# Patient Record
Sex: MALE | Race: WHITE | Employment: UNEMPLOYED | URBAN - METROPOLITAN AREA
[De-identification: names, ages, dates, MRNs, and addresses within clinical notes are randomized per-mention and may not be internally consistent; named-entity substitution may affect disease eponyms.]

---

## 2024-11-19 ENCOUNTER — HOSPITAL ENCOUNTER (OUTPATIENT)
Age: 54
Discharge: HOME | End: 2024-11-19
Payer: COMMERCIAL

## 2024-11-19 ENCOUNTER — HOSPITAL ENCOUNTER (OUTPATIENT)
Dept: GENERAL RADIOLOGY | Age: 54
Discharge: HOME | End: 2024-11-21
Payer: COMMERCIAL

## 2024-11-19 ENCOUNTER — APPOINTMENT (OUTPATIENT)
Dept: GENERAL RADIOLOGY | Age: 54
End: 2024-11-19
Payer: COMMERCIAL

## 2024-11-19 ENCOUNTER — HOSPITAL ENCOUNTER (OUTPATIENT)
Age: 54
Discharge: HOME | End: 2024-11-21
Payer: COMMERCIAL

## 2024-11-19 DIAGNOSIS — I48.0 PAROXYSMAL ATRIAL FIBRILLATION (HCC): ICD-10-CM

## 2024-11-19 LAB
ALBUMIN SERPL-MCNC: 4.4 G/DL (ref 3.5–5.2)
ALP SERPL-CCNC: 85 U/L (ref 40–129)
ALT SERPL-CCNC: 21 U/L (ref 0–40)
ANION GAP SERPL CALCULATED.3IONS-SCNC: 8 MMOL/L (ref 7–16)
AST SERPL-CCNC: 18 U/L (ref 0–39)
BASOPHILS # BLD: 0.07 K/UL (ref 0–0.2)
BASOPHILS NFR BLD: 1 % (ref 0–2)
BILIRUB SERPL-MCNC: 0.5 MG/DL (ref 0–1.2)
BUN SERPL-MCNC: 12 MG/DL (ref 6–20)
CALCIUM SERPL-MCNC: 9.2 MG/DL (ref 8.6–10.2)
CHLORIDE SERPL-SCNC: 104 MMOL/L (ref 98–107)
CO2 SERPL-SCNC: 30 MMOL/L (ref 22–29)
CREAT SERPL-MCNC: 0.9 MG/DL (ref 0.7–1.2)
EKG ATRIAL RATE: 51 BPM
EKG P AXIS: 73 DEGREES
EKG P-R INTERVAL: 164 MS
EKG Q-T INTERVAL: 452 MS
EKG QRS DURATION: 108 MS
EKG QTC CALCULATION (BAZETT): 416 MS
EKG R AXIS: -50 DEGREES
EKG T AXIS: 42 DEGREES
EKG VENTRICULAR RATE: 51 BPM
EOSINOPHIL # BLD: 0.81 K/UL (ref 0.05–0.5)
EOSINOPHILS RELATIVE PERCENT: 7 % (ref 0–6)
ERYTHROCYTE [DISTWIDTH] IN BLOOD BY AUTOMATED COUNT: 13.6 % (ref 11.5–15)
GFR, ESTIMATED: >90 ML/MIN/1.73M2
GLUCOSE SERPL-MCNC: 90 MG/DL (ref 74–99)
HBA1C MFR BLD: 6.1 % (ref 4–5.6)
HCT VFR BLD AUTO: 51.5 % (ref 37–54)
HGB BLD-MCNC: 17.1 G/DL (ref 12.5–16.5)
IMM GRANULOCYTES # BLD AUTO: 0.06 K/UL (ref 0–0.58)
IMM GRANULOCYTES NFR BLD: 1 % (ref 0–5)
LYMPHOCYTES NFR BLD: 2.21 K/UL (ref 1.5–4)
LYMPHOCYTES RELATIVE PERCENT: 20 % (ref 20–42)
MCH RBC QN AUTO: 30.8 PG (ref 26–35)
MCHC RBC AUTO-ENTMCNC: 33.2 G/DL (ref 32–34.5)
MCV RBC AUTO: 92.8 FL (ref 80–99.9)
MONOCYTES NFR BLD: 1.04 K/UL (ref 0.1–0.95)
MONOCYTES NFR BLD: 10 % (ref 2–12)
NEUTROPHILS NFR BLD: 62 % (ref 43–80)
NEUTS SEG NFR BLD: 6.78 K/UL (ref 1.8–7.3)
PLATELET # BLD AUTO: 260 K/UL (ref 130–450)
PMV BLD AUTO: 9.4 FL (ref 7–12)
POTASSIUM SERPL-SCNC: 4.7 MMOL/L (ref 3.5–5)
PROT SERPL-MCNC: 7.5 G/DL (ref 6.4–8.3)
RBC # BLD AUTO: 5.55 M/UL (ref 3.8–5.8)
SODIUM SERPL-SCNC: 142 MMOL/L (ref 132–146)
T4 FREE SERPL-MCNC: 1.3 NG/DL (ref 0.9–1.7)
TSH SERPL DL<=0.05 MIU/L-ACNC: 1.82 UIU/ML (ref 0.27–4.2)
WBC OTHER # BLD: 11 K/UL (ref 4.5–11.5)

## 2024-11-19 PROCEDURE — 36415 COLL VENOUS BLD VENIPUNCTURE: CPT

## 2024-11-19 PROCEDURE — 80061 LIPID PANEL: CPT

## 2024-11-19 PROCEDURE — 84439 ASSAY OF FREE THYROXINE: CPT

## 2024-11-19 PROCEDURE — 84443 ASSAY THYROID STIM HORMONE: CPT

## 2024-11-19 PROCEDURE — 83036 HEMOGLOBIN GLYCOSYLATED A1C: CPT

## 2024-11-19 PROCEDURE — 71046 X-RAY EXAM CHEST 2 VIEWS: CPT

## 2024-11-19 PROCEDURE — 85025 COMPLETE CBC W/AUTO DIFF WBC: CPT

## 2024-11-19 PROCEDURE — 93005 ELECTROCARDIOGRAM TRACING: CPT | Performed by: NURSE PRACTITIONER

## 2024-11-19 PROCEDURE — 80053 COMPREHEN METABOLIC PANEL: CPT

## 2024-11-20 LAB
CHOLEST SERPL-MCNC: 130 MG/DL
HDLC SERPL-MCNC: 53 MG/DL
LDLC SERPL CALC-MCNC: 53 MG/DL
TRIGL SERPL-MCNC: 118 MG/DL
VLDLC SERPL CALC-MCNC: 24 MG/DL

## 2024-12-23 ENCOUNTER — HOSPITAL ENCOUNTER (OUTPATIENT)
Dept: MRI IMAGING | Age: 54
Discharge: HOME | End: 2024-12-25
Payer: COMMERCIAL

## 2024-12-23 ENCOUNTER — HOSPITAL ENCOUNTER (OUTPATIENT)
Dept: PULMONOLOGY | Age: 54
Discharge: HOME | End: 2024-12-23
Payer: COMMERCIAL

## 2024-12-23 DIAGNOSIS — Z86.73: ICD-10-CM

## 2024-12-23 DIAGNOSIS — I48.0 PAROXYSMAL ATRIAL FIBRILLATION (HCC): ICD-10-CM

## 2024-12-23 DIAGNOSIS — F80.81 STUTTERINGS: ICD-10-CM

## 2024-12-23 DIAGNOSIS — I25.10 ASCVD (ARTERIOSCLEROTIC CARDIOVASCULAR DISEASE): ICD-10-CM

## 2024-12-23 DIAGNOSIS — I25.10 ASHD (ARTERIOSCLEROTIC HEART DISEASE): ICD-10-CM

## 2024-12-23 PROCEDURE — 70547 MR ANGIOGRAPHY NECK W/O DYE: CPT

## 2024-12-23 PROCEDURE — 94726 PLETHYSMOGRAPHY LUNG VOLUMES: CPT

## 2024-12-23 PROCEDURE — 94060 EVALUATION OF WHEEZING: CPT

## 2024-12-23 PROCEDURE — 70551 MRI BRAIN STEM W/O DYE: CPT

## 2024-12-23 PROCEDURE — 94729 DIFFUSING CAPACITY: CPT

## 2024-12-23 PROCEDURE — 70544 MR ANGIOGRAPHY HEAD W/O DYE: CPT

## 2024-12-23 NOTE — PROCEDURES
[address] [address]                           PULMONARY FUNCTION      PATIENT NAME: ROBBIE CRISTOBAL            : 1970  MED REC NO: [id number]                        ROOM:   ACCOUNT NO: [id number]                       ADMIT DATE: 2024  PROVIDER: Heather Ware MD      DATE OF PROCEDURE: 2024    SURGEON:  Heather Ware MD    REFERRING PHYSICIAN:  CARLOS SALMERON III    The spirometry shows minimal reduction in FVC.  FEV1 and FEV1/FVC are moderately decreased.  The maximum voluntary ventilation is 38% of predicted value.    According to Z-score criteria, FVC, FEV1, and FEV1/FVC are outside the area of curve, more than standard deviation of -1.64.  After bronchodilator therapy, there is significant improvement seen in the spirometry.    The lung volume study shows normal TLC, but increased RV.    The diffusion capacity is minimally decreased.    IMPRESSION:  The above study shows moderate obstructive ventilatory impairment with air trapping, and there is significant improvement after bronchodilator therapy.    GOLD criteria stage II chronic obstructive pulmonary disease.  This patient may have chronic obstructive pulmonary disease-asthma overlap syndrome.  Clinical correlation needed.          HEATHER WARE MD      D:  2024 08:33:41     T:  2024 09:00:27     NAVEEN/RHIANNON  Job #:  [id number]     Doc#:  [id number]

## 2025-01-30 ENCOUNTER — TELEPHONE (OUTPATIENT)
Dept: ADMINISTRATIVE | Age: 55
End: 2025-01-30

## 2025-01-30 NOTE — TELEPHONE ENCOUNTER
.Patient Appointment Form:      PCP: Reinaldo Nichole III, APRN - NP    Referring: Reinaldo Nichole III, APRN - NP    Has the Patient:    Seen a Cardiologist? yes    date:5/23/2024  Physician:Dr. Medrano  location:[facility]     Had a heart catheterization? no    Had heart surgery? no    Had a stress test or nuclear stress test? yes   date: 12/2022   facility name:  unknown    Had an echocardiogram? yes   date: 11/4/2022   facility name:  [facility]    Had a vascular ultrasound? yes   date: unknown   facility name:  unknown    Had a 24/48 heart monitor or extended cardiac event monitor? 24hr   date: unknown      Who ordered: unknown    Had recent blood work in the last 6 months? yes    date: 11/19/2024    ordering physician: Reinaldo Nichole III, ARPN-NP    Had a pacemaker/ICD/ILR implant? yes: ILR      device company: unknown    Seen an Electrophysiologist? no        Will send records via: in Epic      Date & time of appointment:  2/21/2025 Agustin

## 2025-02-21 ENCOUNTER — OFFICE VISIT (OUTPATIENT)
Dept: CARDIOLOGY CLINIC | Age: 55
End: 2025-02-21
Payer: COMMERCIAL

## 2025-02-21 VITALS
RESPIRATION RATE: 16 BRPM | WEIGHT: 279 LBS | HEART RATE: 59 BPM | DIASTOLIC BLOOD PRESSURE: 72 MMHG | SYSTOLIC BLOOD PRESSURE: 110 MMHG | BODY MASS INDEX: 41.32 KG/M2 | HEIGHT: 69 IN

## 2025-02-21 DIAGNOSIS — Z86.73: Primary | ICD-10-CM

## 2025-02-21 PROCEDURE — 99244 OFF/OP CNSLTJ NEW/EST MOD 40: CPT | Performed by: INTERNAL MEDICINE

## 2025-02-21 PROCEDURE — 93000 ELECTROCARDIOGRAM COMPLETE: CPT | Performed by: INTERNAL MEDICINE

## 2025-02-21 RX ORDER — ATORVASTATIN CALCIUM 20 MG/1
20 TABLET, FILM COATED ORAL DAILY
COMMUNITY
Start: 2025-01-17

## 2025-02-21 RX ORDER — AMIODARONE HYDROCHLORIDE 200 MG/1
200 TABLET ORAL DAILY
COMMUNITY
Start: 2025-01-16 | End: 2025-02-21

## 2025-02-21 RX ORDER — BUSPIRONE HYDROCHLORIDE 10 MG/1
10 TABLET ORAL 2 TIMES DAILY
COMMUNITY

## 2025-02-21 RX ORDER — METOPROLOL SUCCINATE 50 MG/1
50 TABLET, EXTENDED RELEASE ORAL DAILY
COMMUNITY
Start: 2025-01-03

## 2025-02-21 RX ORDER — GABAPENTIN 100 MG/1
100 CAPSULE ORAL DAILY
COMMUNITY

## 2025-02-21 RX ORDER — ALBUTEROL SULFATE 90 UG/1
2 INHALANT RESPIRATORY (INHALATION) EVERY 6 HOURS PRN
COMMUNITY

## 2025-02-21 RX ORDER — NITROGLYCERIN 0.4 MG/1
0.4 TABLET SUBLINGUAL EVERY 5 MIN PRN
COMMUNITY

## 2025-02-21 RX ORDER — FLUTICASONE FUROATE, UMECLIDINIUM BROMIDE AND VILANTEROL TRIFENATATE 100; 62.5; 25 UG/1; UG/1; UG/1
1 POWDER RESPIRATORY (INHALATION) DAILY
COMMUNITY

## 2025-02-21 RX ORDER — APIXABAN 5 MG/1
5 TABLET, FILM COATED ORAL 2 TIMES DAILY
COMMUNITY
Start: 2025-01-03

## 2025-02-21 NOTE — PROGRESS NOTES
[facility] consult  Dr. Tamela Lebron      Reason for Consult: Atrial fibrillation  Referring Physician: Reinaldo Nichole III, APRN - NP     CHIEF COMPLAINT:   Chief Complaint   Patient presents with    Consultation     NP per Dionisio DX PAF CAD       HISTORY OF PRESENT ILLNESS:   Patient is 54 years old male with atrial fibrillation, COPD, is here to get established with cardiology.  Occasional palpitations, comes and goes, with associated shortness of breath and diaphoresis, not as common as they used to be before, denies any chest pain, + shortness of breath with exertion which is at baseline, occasional pedal edema, occasional dizziness no PND, no orthopnea, no syncope.    History reviewed. No pertinent past medical history.      Past Surgical History:   Procedure Laterality Date    LIPOMA RESECTION  04/18/2024         Current Outpatient Medications   Medication Sig Dispense Refill    amiodarone (CORDARONE) 200 MG tablet Take 1 tablet by mouth daily      metoprolol succinate (TOPROL XL) 50 MG extended release tablet Take 1 tablet by mouth daily      ELIQUIS 5 MG TABS tablet Take 1 tablet by mouth 2 times daily      atorvastatin (LIPITOR) 20 MG tablet Take 1 tablet by mouth daily      fluticasone-umeclidin-vilant (TRELEGY ELLIPTA) 100-62.5-25 MCG/ACT AEPB inhaler Inhale 1 puff into the lungs daily      albuterol sulfate HFA (VENTOLIN HFA) 108 (90 Base) MCG/ACT inhaler Inhale 2 puffs into the lungs every 6 hours as needed for Wheezing      nitroGLYCERIN (NITROSTAT) 0.4 MG SL tablet Place 1 tablet under the tongue every 5 minutes as needed for Chest pain up to max of 3 total doses. If no relief after 1 dose, call 911.      busPIRone (BUSPAR) 10 MG tablet Take 1 tablet by mouth in the morning and at bedtime      gabapentin (NEURONTIN) 100 MG capsule Take 1 capsule by mouth daily.       No current facility-administered medications for this visit.         Allergies as of 02/21/2025 - never reviewed   Allergen

## 2025-05-21 ENCOUNTER — TELEPHONE (OUTPATIENT)
Dept: CARDIOLOGY CLINIC | Age: 55
End: 2025-05-21

## 2025-05-21 NOTE — TELEPHONE ENCOUNTER
I attempted to call pt to remind him of appt but neither phone number listed was in service. Unable to reach pt.

## 2025-07-16 ENCOUNTER — TRANSCRIBE ORDERS (OUTPATIENT)
Dept: ADMINISTRATIVE | Age: 55
End: 2025-07-16

## 2025-07-16 DIAGNOSIS — F17.200 TOBACCO USE DISORDER: Primary | ICD-10-CM

## 2025-07-28 ENCOUNTER — OFFICE VISIT (OUTPATIENT)
Age: 55
End: 2025-07-28
Payer: COMMERCIAL

## 2025-07-28 VITALS
BODY MASS INDEX: 41.16 KG/M2 | WEIGHT: 277.9 LBS | HEART RATE: 72 BPM | SYSTOLIC BLOOD PRESSURE: 108 MMHG | HEIGHT: 69 IN | DIASTOLIC BLOOD PRESSURE: 76 MMHG

## 2025-07-28 DIAGNOSIS — G43.109 COMPLICATED MIGRAINE: Primary | ICD-10-CM

## 2025-07-28 DIAGNOSIS — G43.109 OCULAR MIGRAINE: ICD-10-CM

## 2025-07-28 DIAGNOSIS — H50.9 STRABISMUS: ICD-10-CM

## 2025-07-28 DIAGNOSIS — T75.3XXS MOTION SICKNESS, SEQUELA: ICD-10-CM

## 2025-07-28 DIAGNOSIS — G43.809 VESTIBULAR MIGRAINE: ICD-10-CM

## 2025-07-28 PROCEDURE — 99204 OFFICE O/P NEW MOD 45 MIN: CPT | Performed by: PSYCHIATRY & NEUROLOGY

## 2025-07-28 RX ORDER — TOPIRAMATE 50 MG/1
50 TABLET, FILM COATED ORAL
Qty: 30 TABLET | Refills: 3 | Status: SHIPPED | OUTPATIENT
Start: 2025-07-28

## 2025-07-28 RX ORDER — TOPIRAMATE 25 MG/1
25 TABLET, FILM COATED ORAL NIGHTLY
Qty: 14 TABLET | Refills: 0 | Status: SHIPPED | OUTPATIENT
Start: 2025-07-28 | End: 2025-08-11

## 2025-07-28 NOTE — PROGRESS NOTES
Ana Cristina Cat MD    Vincent Pneg is a 55 y.o. male presenting as a new patient for a   Chief Complaint   Patient presents with    New Patient    Transient Ischemic Attack        Onset: SEP 2022  [place] 2024      Symptoms:   Symptom complex: following set of symptoms    Frequency: less frequent on eliquis  Still happen    Now: 1-2/month     Sob  Dizziness- no vertigo, has lightheadedness   Palpitations  Ham hands go numb  Feels restless  Can walk, slightly feels unsteady  Neck feels stiff and shoulders feel stiff   Lasts couple of hours.       Association: had photophobia, phonophobia  Needs to urinate after a episode  No nausea/vomiting  No headache at all   No falls  No LOC.     Intermittent episodes of pixelated vision ham eyes, lasts 5 mins     Has motion sickness-     Relief: Goes into a dark room for hours       Treatment: was diagnosed with atrial fibrillation- on eliquis 5 mg bid   Stopped pacerone  Metoprolol       In between episodes:       Weakness: No  Spastically: No  Numbness: has constant numbness in ham hands, in ham thighs, feet   Blurred Vision: Yes: intermittent episodes of pixilated vision, with pieces missing  Lasts 5 minutes.       Double Vision: No  Slurred Speech/Speech Difficulty: No  Trouble Swallowing: No  Balance: Yes: unsteady balance  Falls: No  Memory Trouble: starting to forget.      Treatment: eliquis + metoprolol  Lipitor 20 mg qhs       W/u:  Mri brain:   FINDINGS:  INTRACRANIAL STRUCTURES/VENTRICLES: There is no acute infarct. No mass effect  or midline shift. No evidence of an acute intracranial hemorrhage.  The  ventricles and sulci are normal in size and configuration.  The  sellar/suprasellar regions appear unremarkable.  The normal signal voids  within the major intracranial vessels appear maintained.     ORBITS: Prosthetic lenses are seen in the globes bilaterally.  The orbits are  otherwise grossly unremarkable.     SINUSES: Mild mucosal